# Patient Record
Sex: FEMALE | Race: WHITE | NOT HISPANIC OR LATINO
[De-identification: names, ages, dates, MRNs, and addresses within clinical notes are randomized per-mention and may not be internally consistent; named-entity substitution may affect disease eponyms.]

---

## 2018-01-15 ENCOUNTER — TRANSCRIPTION ENCOUNTER (OUTPATIENT)
Age: 73
End: 2018-01-15

## 2022-11-23 PROBLEM — Z00.00 ENCOUNTER FOR PREVENTIVE HEALTH EXAMINATION: Status: ACTIVE | Noted: 2022-11-23

## 2022-12-02 ENCOUNTER — NON-APPOINTMENT (OUTPATIENT)
Age: 77
End: 2022-12-02

## 2022-12-02 ENCOUNTER — APPOINTMENT (OUTPATIENT)
Dept: PODIATRY | Facility: CLINIC | Age: 77
End: 2022-12-02

## 2022-12-02 VITALS
DIASTOLIC BLOOD PRESSURE: 70 MMHG | WEIGHT: 160 LBS | BODY MASS INDEX: 29.44 KG/M2 | OXYGEN SATURATION: 99 % | HEART RATE: 66 BPM | SYSTOLIC BLOOD PRESSURE: 114 MMHG | HEIGHT: 62 IN

## 2022-12-02 DIAGNOSIS — G89.29 PAIN IN LEFT FOOT: ICD-10-CM

## 2022-12-02 DIAGNOSIS — Z78.9 OTHER SPECIFIED HEALTH STATUS: ICD-10-CM

## 2022-12-02 DIAGNOSIS — M79.672 PAIN IN LEFT FOOT: ICD-10-CM

## 2022-12-02 PROCEDURE — 99203 OFFICE O/P NEW LOW 30 MIN: CPT | Mod: 25

## 2022-12-02 NOTE — PHYSICAL EXAM
[General Appearance - Alert] : alert [General Appearance - In No Acute Distress] : in no acute distress [Sensation] : the sensory exam was normal to light touch and pinprick [No Focal Deficits] : no focal deficits [Deep Tendon Reflexes (DTR)] : deep tendon reflexes were 2+ and symmetric [Motor Exam] : the motor exam was normal [Oriented To Time, Place, And Person] : oriented to person, place, and time [Impaired Insight] : insight and judgment were intact [Affect] : the affect was normal [de-identified] : overall muscle strength testing is normal, ROM to ankle, mid tarsal, MTP joints all WNL and w/out crepitus or jamming. No atrophy and overall weakness noted.\par  [FreeTextEntry1] : The patient has all contributing factors to onychomycosis including but not limited to thickness, subungual debris, discoloration and partial lysis and they are brittle when cut the the GTN\par Evaluation of the area of chief complaint reveals a mild noninfected but reddened distal portion of the afffected nail. There is no discharge or drainage there is no sign of infectious process signs and symptoms are consistent with the noninfected ingrown nail\par to the left gtn mainly the medial side\par

## 2022-12-02 NOTE — PROCEDURE
[FreeTextEntry1] : I had a lengthy discussion with the patient regarding the way they should be cutting a nail in an effort to help prevent recurrence of this problem. I also revised self treatment should not be attempted and should there be any redness or pain on the side of the nail rather than treating it themselves they should call the office to make a follow up.\par I have had a lengthy discussion with the patient regarding overall skincare. The importance of the type of socks, the type of shoes, and the type of overall foot hygiene is important to help control and prevent eruptions especially over extreme weather changes. This included but was not limited to hydration and lubrication, dove soap, triple rinse clothing and linens. I also explained the importance of thorough drying of both feet especially the web spaces. Given the extreme temperatures back in a car I also reviewed the type of shoes that would help reduce the chances of cracking of the skin especially leading to fissuring of the heels. Overall skincare precautions were reviewed education literature dispensed in the patient's questions asked and answered appropriately.\par Using sterile instrumentation a slant back procedure was done to the affected border. The area was then painted with a small amount of betadine and silvadene and a band aid. Discharge instructions were given to the patient and advised bacitracin daily for the next 3-5 days and if not completely better they should contact the office immediately.\par follow up prn\par

## 2022-12-02 NOTE — HISTORY OF PRESENT ILLNESS
[FreeTextEntry1] : Location- left GTN\par Duration- > 5 years\par Past tx- self tx, nail evelia, other DPM, myself amny years ago\par

## 2023-02-14 ENCOUNTER — OFFICE (OUTPATIENT)
Dept: URBAN - METROPOLITAN AREA CLINIC 30 | Facility: CLINIC | Age: 78
Setting detail: OPHTHALMOLOGY
End: 2023-02-14
Payer: MEDICARE

## 2023-02-14 DIAGNOSIS — H25.093: ICD-10-CM

## 2023-02-14 DIAGNOSIS — H16.223: ICD-10-CM

## 2023-02-14 DIAGNOSIS — H10.13: ICD-10-CM

## 2023-02-14 DIAGNOSIS — H52.4: ICD-10-CM

## 2023-02-14 PROCEDURE — 92015 DETERMINE REFRACTIVE STATE: CPT | Performed by: OPHTHALMOLOGY

## 2023-02-14 PROCEDURE — 92004 COMPRE OPH EXAM NEW PT 1/>: CPT | Performed by: OPHTHALMOLOGY

## 2023-02-14 ASSESSMENT — TONOMETRY
OD_IOP_MMHG: 16
OS_IOP_MMHG: 16

## 2023-02-14 ASSESSMENT — REFRACTION_MANIFEST
OS_ADD: +2.75
OD_SPHERE: PLANO
OS_SPHERE: +0.50
OD_ADD: +2.75
OD_VA1: 20/40
OD_AXIS: 180
OS_CYLINDER: SPH
OS_VA1: 20/40
OD_CYLINDER: +0.50

## 2023-02-14 ASSESSMENT — REFRACTION_CURRENTRX
OS_ADD: +2.75
OD_AXIS: 180
OS_OVR_VA: 20/
OS_CYLINDER: SPH
OD_OVR_VA: 20/
OD_CYLINDER: +0.50
OD_ADD: +2.75
OD_SPHERE: PLANO
OS_SPHERE: +0.50

## 2023-02-14 ASSESSMENT — VISUAL ACUITY
OD_BCVA: 20/30
OS_BCVA: 20/30

## 2023-02-14 ASSESSMENT — CONFRONTATIONAL VISUAL FIELD TEST (CVF)
OD_FINDINGS: FULL
OS_FINDINGS: FULL

## 2023-02-14 ASSESSMENT — REFRACTION_AUTOREFRACTION
OS_CYLINDER: +0.50
OS_SPHERE: PLANO
OD_SPHERE: PLANO
OD_CYLINDER: +0.50
OS_AXIS: 050
OD_AXIS: 085

## 2023-03-16 ENCOUNTER — RX ONLY (RX ONLY)
Age: 78
End: 2023-03-16

## 2023-03-16 ENCOUNTER — OFFICE (OUTPATIENT)
Dept: URBAN - METROPOLITAN AREA CLINIC 29 | Facility: CLINIC | Age: 78
Setting detail: OPHTHALMOLOGY
End: 2023-03-16
Payer: MEDICARE

## 2023-03-16 DIAGNOSIS — H16.223: ICD-10-CM

## 2023-03-16 DIAGNOSIS — H10.13: ICD-10-CM

## 2023-03-16 DIAGNOSIS — H25.093: ICD-10-CM

## 2023-03-16 PROBLEM — H01.004 BLEPHARITIS; RIGHT UPPER LID, LEFT UPPER LID: Status: ACTIVE | Noted: 2023-03-16

## 2023-03-16 PROBLEM — H01.001 BLEPHARITIS; RIGHT UPPER LID, LEFT UPPER LID: Status: ACTIVE | Noted: 2023-03-16

## 2023-03-16 PROCEDURE — 92012 INTRM OPH EXAM EST PATIENT: CPT | Performed by: OPHTHALMOLOGY

## 2023-03-16 ASSESSMENT — VISUAL ACUITY
OD_BCVA: 20/40
OS_BCVA: 20/50

## 2023-03-16 ASSESSMENT — REFRACTION_CURRENTRX
OS_ADD: +2.75
OD_SPHERE: PLANO
OD_AXIS: 180
OD_ADD: +2.75
OD_OVR_VA: 20/
OS_OVR_VA: 20/
OS_CYLINDER: SPH
OS_SPHERE: +0.50
OD_CYLINDER: +0.50

## 2023-03-16 ASSESSMENT — REFRACTION_MANIFEST
OD_AXIS: 180
OD_CYLINDER: +0.50
OD_SPHERE: PLANO
OS_ADD: +2.75
OS_SPHERE: +0.50
OS_VA1: 20/40
OD_VA1: 20/40
OS_CYLINDER: SPH
OD_ADD: +2.75

## 2023-03-16 ASSESSMENT — CONFRONTATIONAL VISUAL FIELD TEST (CVF)
OS_FINDINGS: FULL
OD_FINDINGS: FULL

## 2023-03-16 ASSESSMENT — REFRACTION_AUTOREFRACTION
OS_CYLINDER: +0.50
OS_SPHERE: PLANO
OD_CYLINDER: +0.50
OD_SPHERE: PLANO
OS_AXIS: 050
OD_AXIS: 085

## 2023-03-16 ASSESSMENT — LID EXAM ASSESSMENTS
OS_COMMENTS: BLEPHARITIS CHANGES OF THE EYELID MARGINS
OD_COMMENTS: BLEPHARITIS CHANGES OF THE EYELID MARGINS

## 2024-01-03 ENCOUNTER — LABORATORY RESULT (OUTPATIENT)
Age: 79
End: 2024-01-03

## 2024-01-03 ENCOUNTER — APPOINTMENT (OUTPATIENT)
Dept: PODIATRY | Facility: CLINIC | Age: 79
End: 2024-01-03
Payer: MEDICARE

## 2024-01-03 VITALS — WEIGHT: 150 LBS | BODY MASS INDEX: 27.6 KG/M2 | HEIGHT: 62 IN

## 2024-01-03 DIAGNOSIS — B35.1 TINEA UNGUIUM: ICD-10-CM

## 2024-01-03 DIAGNOSIS — B35.3 TINEA PEDIS: ICD-10-CM

## 2024-01-03 PROCEDURE — 99213 OFFICE O/P EST LOW 20 MIN: CPT

## 2024-01-03 NOTE — HISTORY OF PRESENT ILLNESS
[FreeTextEntry1] : Location- right GTN Duration- > 5 years Past tx- self tx, nail salon, other DPM, myself

## 2024-01-03 NOTE — PROCEDURE
[FreeTextEntry1] : A lengthy and inform a discussion with the patient regarding different types of treatments for the mycotic nail disease. I stressed clinical versus mycologic cure rates and anticipated success rates regarding topical medications oral medications as well as laser therapy. I discussed in great length with the patient the success rates and success rates anticipated. There were no guarantees given regarding any of the treatment reviewed. I did explain to the patient that there are risks to oral antifungal therapy however those risks can be greatly decreased with obtaining blood tests prior and possibly during the treatment if indicated. The patient will weigh their options and contact the office A significant and tissue sample was taken for a KOH prep and the patient will be notified of the results A significant portion of nail and nail bed debris taken for nail fungus culture follow up appt 4 weeks

## 2024-01-03 NOTE — REASON FOR VISIT
[Follow-Up Visit] : a follow-up visit for [Foot Pain] : foot pain [Onychomycosis] : onychomycosis [FreeTextEntry2] : right

## 2024-01-03 NOTE — PHYSICAL EXAM
[General Appearance - Alert] : alert [General Appearance - In No Acute Distress] : in no acute distress [de-identified] : overall muscle strength testing is normal, ROM to ankle, mid tarsal, MTP joints all WNL and w/out crepitus or jamming. No atrophy and overall weakness noted.\par   [FreeTextEntry1] : The patient has all contributing factors to onychomycosis including but not limited to thickness, subungual debris, discoloration and partial lysis and they are brittle when cut the the GTN

## 2024-06-03 ENCOUNTER — APPOINTMENT (OUTPATIENT)
Dept: PODIATRY | Facility: CLINIC | Age: 79
End: 2024-06-03
Payer: MEDICARE

## 2024-06-03 VITALS
BODY MASS INDEX: 28.52 KG/M2 | SYSTOLIC BLOOD PRESSURE: 125 MMHG | WEIGHT: 155 LBS | TEMPERATURE: 96.8 F | HEIGHT: 62 IN | DIASTOLIC BLOOD PRESSURE: 75 MMHG

## 2024-06-03 DIAGNOSIS — L60.0 INGROWING NAIL: ICD-10-CM

## 2024-06-03 DIAGNOSIS — L60.3 NAIL DYSTROPHY: ICD-10-CM

## 2024-06-03 DIAGNOSIS — L60.2 ONYCHOGRYPHOSIS: ICD-10-CM

## 2024-06-03 PROCEDURE — 99213 OFFICE O/P EST LOW 20 MIN: CPT

## 2024-06-03 NOTE — PHYSICAL EXAM
[General Appearance - Alert] : alert [General Appearance - In No Acute Distress] : in no acute distress [de-identified] : overall muscle strength testing is normal, ROM to ankle, mid tarsal, MTP joints all WNL and w/out crepitus or jamming. No atrophy and overall weakness noted.\par   [FreeTextEntry1] : The patient has all contributing factors to onychomycosis including but not limited to thickness, subungual debris, discoloration and partial lysis and they are brittle when cut the the GTN

## 2024-06-03 NOTE — PROCEDURE
[FreeTextEntry1] : Based on my physical examination and my clinical findings and the patient's description of the symptoms, a complete differential diagnosis was reviewed with the patient. Possible diagnoses as well as treatment options explained in great detail. All questions asked and answered appropriately. Using sterile instrumentation debridement of all nails manually and electrically to decrease thickness, pain and girth and make shoe gear more comfortable with "slant back" procedure of any bordering spicules causing pain I had a lengthy discussion with the patient regarding the way they should be cutting a nail in an effort to help prevent recurrence of this problem. I also revised self treatment should not be attempted and should there be any redness or pain on the side of the nail rather than treating it themselves they should call the office to make a follow up. I lengthy discussion with the patient today regarding hygiene and foot health. My discussion to focus mainly on prevention of pathology and remaining proactive. My discussion included but was not limited to overall foot health, foot hygiene, the risks of walking barefoot especially in public places and the need to be conscious of the cleanliness of facilities where not only in a walk barefoot but other people walk barefoot as well. All questions were asked and answered appropriately and educational literature was dispensed follow up prn

## 2024-06-03 NOTE — HISTORY OF PRESENT ILLNESS
[FreeTextEntry1] : The patient presents for follow up of chronic and painful mycotic nail disease. Past professional  tx's in the presence of PAD have consisted of periodic debridements which have offered significant relief and controlling of symptoms